# Patient Record
Sex: MALE | Race: WHITE | NOT HISPANIC OR LATINO | Employment: OTHER | URBAN - METROPOLITAN AREA
[De-identification: names, ages, dates, MRNs, and addresses within clinical notes are randomized per-mention and may not be internally consistent; named-entity substitution may affect disease eponyms.]

---

## 2018-11-08 NOTE — PROCEDURE NOTE: SURGICAL
"&nbsp; <span style=""font-weight:bold;"">MR #:</span>&nbsp; [id number]<br /><br /><span style=""font-weight:bold;"">PREOPERATIVE DIAGNOSIS:</span>&nbsp; Cataract

## 2018-11-20 NOTE — PROCEDURE NOTE: SURGICAL
"<span style=""font-weight:bold;"">MR #:</span>&nbsp; [id number]<br /><br /><span style=""font-weight:bold;"">PREOPERATIVE DIAGNOSIS:</span>&nbsp; Cataract

## 2019-06-18 NOTE — PATIENT DISCUSSION
24/7 On call service reviewed with pt if any sudden changes in [address] or pain occurs.

## 2021-01-15 ENCOUNTER — NEW PATIENT COMPREHENSIVE (OUTPATIENT)
Dept: URBAN - METROPOLITAN AREA CLINIC 35 | Facility: CLINIC | Age: 76
End: 2021-01-15

## 2021-01-15 DIAGNOSIS — H52.7: ICD-10-CM

## 2021-01-15 DIAGNOSIS — Z01.00: ICD-10-CM

## 2021-01-15 PROCEDURE — 92015 DETERMINE REFRACTIVE STATE: CPT

## 2021-01-15 PROCEDURE — 92004 COMPRE OPH EXAM NEW PT 1/>: CPT

## 2021-01-15 ASSESSMENT — VISUAL ACUITY
OS_CC: J3
OD_CC: J12
OS_CC: 20/70
OD_SC: 20/400
OD_CC: 20/200
OS_SC: CF 6FT

## 2021-01-15 ASSESSMENT — TONOMETRY
OS_IOP_MMHG: 16
OD_IOP_MMHG: 14

## 2021-02-02 ENCOUNTER — CATARACT CONSULT (OUTPATIENT)
Dept: URBAN - METROPOLITAN AREA CLINIC 35 | Facility: CLINIC | Age: 76
End: 2021-02-02

## 2021-02-02 DIAGNOSIS — H27.131: ICD-10-CM

## 2021-02-02 DIAGNOSIS — H25.812: ICD-10-CM

## 2021-02-02 PROCEDURE — V2799PMN IMPRIMIS PRED-MOXI-NEPAF 5ML

## 2021-02-02 PROCEDURE — 92136TC INTERFEROMETRY - TECHNICAL COMPONENT

## 2021-02-02 PROCEDURE — 92014 COMPRE OPH EXAM EST PT 1/>: CPT

## 2021-02-02 ASSESSMENT — VISUAL ACUITY
OD_SC: 20/400
OD_CC: 20/80
OD_CC: J3
OS_CC: 20/40+1
OS_CC: J1

## 2021-02-02 ASSESSMENT — TONOMETRY
OD_IOP_MMHG: 15
OS_IOP_MMHG: 15

## 2021-02-11 ENCOUNTER — SURGERY/PROCEDURE (OUTPATIENT)
Dept: URBAN - METROPOLITAN AREA CLINIC 35 | Facility: CLINIC | Age: 76
End: 2021-02-11

## 2021-02-11 ENCOUNTER — H&P (OUTPATIENT)
Dept: URBAN - METROPOLITAN AREA CLINIC 39 | Facility: CLINIC | Age: 76
End: 2021-02-11

## 2021-02-11 DIAGNOSIS — H25.812: ICD-10-CM

## 2021-02-11 DIAGNOSIS — H27.131: ICD-10-CM

## 2021-02-11 PROCEDURE — 99211T TECH SERVICE

## 2021-02-11 PROCEDURE — 66984 XCAPSL CTRC RMVL W/O ECP: CPT

## 2021-02-12 ENCOUNTER — CATARACT POST-OP 1-DAY (OUTPATIENT)
Dept: URBAN - METROPOLITAN AREA CLINIC 35 | Facility: CLINIC | Age: 76
End: 2021-02-12

## 2021-02-12 DIAGNOSIS — Z96.1: ICD-10-CM

## 2021-02-12 DIAGNOSIS — H27.131: ICD-10-CM

## 2021-02-12 PROCEDURE — 99024 POSTOP FOLLOW-UP VISIT: CPT

## 2021-02-12 ASSESSMENT — VISUAL ACUITY
OD_CC: 20/50-1
OS_SC: 20/30-1
OS_SC: J12
OD_SC: 20/70-1

## 2021-02-12 ASSESSMENT — TONOMETRY: OS_IOP_MMHG: 09

## 2021-03-11 ENCOUNTER — POST-OP (OUTPATIENT)
Dept: URBAN - METROPOLITAN AREA CLINIC 35 | Facility: CLINIC | Age: 76
End: 2021-03-11

## 2021-03-11 DIAGNOSIS — Z96.1: ICD-10-CM

## 2021-03-11 DIAGNOSIS — H27.131: ICD-10-CM

## 2021-03-11 PROCEDURE — 99024 POSTOP FOLLOW-UP VISIT: CPT

## 2021-03-11 ASSESSMENT — VISUAL ACUITY
OS_CC: 20/25
OU_CC: 20/20-2
OU_CC: J4
OD_CC: 20/80

## 2021-03-11 ASSESSMENT — TONOMETRY
OD_IOP_MMHG: 15
OS_IOP_MMHG: 16

## 2021-04-12 ENCOUNTER — REFRACTION ONLY (OUTPATIENT)
Dept: URBAN - METROPOLITAN AREA CLINIC 35 | Facility: CLINIC | Age: 76
End: 2021-04-12

## 2021-04-12 DIAGNOSIS — H27.131: ICD-10-CM

## 2021-04-12 DIAGNOSIS — Z96.1: ICD-10-CM

## 2021-04-12 PROCEDURE — 99211T TECH SERVICE

## 2021-04-12 ASSESSMENT — VISUAL ACUITY
OS_CC: 20/400
OD_CC: 20/50

## 2021-07-12 ENCOUNTER — ESTABLISHED COMPREHENSIVE EXAM (OUTPATIENT)
Dept: URBAN - METROPOLITAN AREA CLINIC 35 | Facility: CLINIC | Age: 76
End: 2021-07-12

## 2021-07-12 DIAGNOSIS — H35.61: ICD-10-CM

## 2021-07-12 DIAGNOSIS — H53.021: ICD-10-CM

## 2021-07-12 DIAGNOSIS — Z96.1: ICD-10-CM

## 2021-07-12 DIAGNOSIS — H27.131: ICD-10-CM

## 2021-07-12 PROCEDURE — 92015 DETERMINE REFRACTIVE STATE: CPT

## 2021-07-12 PROCEDURE — 92014 COMPRE OPH EXAM EST PT 1/>: CPT

## 2021-07-12 PROCEDURE — 92134 CPTRZ OPH DX IMG PST SGM RTA: CPT

## 2021-07-12 ASSESSMENT — TONOMETRY
OS_IOP_MMHG: 15
OD_IOP_MMHG: 14

## 2021-07-12 ASSESSMENT — VISUAL ACUITY
OS_CC: 20/20-1
OD_CC: 20/40-2
OS_CC: J2
OD_CC: J6

## 2022-07-14 ENCOUNTER — ESTABLISHED PATIENT (OUTPATIENT)
Dept: URBAN - METROPOLITAN AREA CLINIC 35 | Facility: CLINIC | Age: 77
End: 2022-07-14

## 2022-07-14 DIAGNOSIS — H35.61: ICD-10-CM

## 2022-07-14 DIAGNOSIS — H27.131: ICD-10-CM

## 2022-07-14 DIAGNOSIS — Z96.1: ICD-10-CM

## 2022-07-14 DIAGNOSIS — H53.021: ICD-10-CM

## 2022-07-14 PROCEDURE — 92015 DETERMINE REFRACTIVE STATE: CPT

## 2022-07-14 PROCEDURE — 92014 COMPRE OPH EXAM EST PT 1/>: CPT

## 2022-07-14 ASSESSMENT — VISUAL ACUITY
OD_CC: J12
OS_CC: 20/20
OS_CC: J1
OU_CC: 20/20
OD_CC: 20/40
OU_CC: J1

## 2022-07-14 ASSESSMENT — TONOMETRY
OD_IOP_MMHG: 14
OS_IOP_MMHG: 15